# Patient Record
(demographics unavailable — no encounter records)

---

## 2024-12-17 NOTE — PROCEDURE
[Large Joint Injection] : Large joint injection [Glenohumeral Joint] : glenohumeral joint [] : Patient tolerated procedure well [Call if redness, pain or fever occur] : call if redness, pain or fever occur [Apply ice for 15min out of every hour for the next 12-24 hours as tolerated] : apply ice for 15 minutes out of every hour for the next 12-24 hours as tolerated [Patient was advised to rest the joint(s) for ____ days] : patient was advised to rest the joint(s) for [unfilled] days [Medium Joint Injection] : medium joint injection [Pain] : pain [Alcohol] : alcohol [Betadine] : betadine [Ethyl Chloride sprayed topically] : ethyl chloride sprayed topically [Sterile technique used] : sterile technique used [___ cc    3mg] :  Betamethasone ([place]) ~Vcc of 3mg [___ cc    1%] : Lidocaine ~Vcc of 1%  [Left] : of the left [CMC Joint] : CMC joint

## 2024-12-17 NOTE — PHYSICAL EXAM
[Right] : right shoulder [Left] : left hand [1st] : 1st [CMC Joint] : CMC joint [] : no swelling [TWNoteComboBox7] : active forward flexion 105 degrees [de-identified] : active abduction 90 degrees [TWNoteComboBox6] : internal rotation L5 [de-identified] : external rotation 45 degrees

## 2024-12-17 NOTE — HISTORY OF PRESENT ILLNESS
[Lower back] : lower back [Dull/Aching] : dull/aching [Localized] : localized [Tightness] : tightness [de-identified] : Has multiple somatic complaints, no injury. Has recent left shoulder pain, marilee with overhead movement. NO neck pain or numbness. Has low back pain, non radiating to legs. Has left hand pain by thumb [] : no [FreeTextEntry5] : lower back pain that developed a while ago, also requesting csi in hand

## 2024-12-17 NOTE — ASSESSMENT
[FreeTextEntry1] : Inject right shoulder, left cmc thumb with cortisone and see how she does PT for low back She had bone density report c/w osteoporosis, suggested she see Dr. Wilder Do for treatment

## 2024-12-17 NOTE — IMAGING
[Right] : right shoulder [Glenohumeral arthritis] : Glenohumeral arthritis [Left] : left hand [Degenerative change] : Degenerative change [Facet arthropathy] : Facet arthropathy [Disc space narrowing] : Disc space narrowing [AP] : anteroposterior [There are no fractures, subluxations or dislocations. No significant abnormalities are seen] : There are no fractures, subluxations or dislocations. No significant abnormalities are seen [FreeTextEntry1] : moderate cmc oa thumb